# Patient Record
Sex: MALE | Race: WHITE | NOT HISPANIC OR LATINO | Employment: FULL TIME | URBAN - NONMETROPOLITAN AREA
[De-identification: names, ages, dates, MRNs, and addresses within clinical notes are randomized per-mention and may not be internally consistent; named-entity substitution may affect disease eponyms.]

---

## 2020-08-20 ENCOUNTER — OFFICE VISIT (OUTPATIENT)
Dept: FAMILY MEDICINE | Facility: OTHER | Age: 50
End: 2020-08-20
Attending: PHYSICIAN ASSISTANT
Payer: COMMERCIAL

## 2020-08-20 VITALS
RESPIRATION RATE: 14 BRPM | HEART RATE: 58 BPM | BODY MASS INDEX: 23.48 KG/M2 | SYSTOLIC BLOOD PRESSURE: 134 MMHG | TEMPERATURE: 98.2 F | WEIGHT: 149.6 LBS | HEIGHT: 67 IN | DIASTOLIC BLOOD PRESSURE: 72 MMHG

## 2020-08-20 DIAGNOSIS — S69.90XA: Primary | ICD-10-CM

## 2020-08-20 PROCEDURE — 10120 INC&RMVL FB SUBQ TISS SMPL: CPT | Performed by: PHYSICIAN ASSISTANT

## 2020-08-20 PROCEDURE — 99202 OFFICE O/P NEW SF 15 MIN: CPT | Mod: 25 | Performed by: PHYSICIAN ASSISTANT

## 2020-08-20 RX ORDER — CEPHALEXIN 500 MG/1
500 CAPSULE ORAL 4 TIMES DAILY
Qty: 28 CAPSULE | Refills: 0 | Status: CANCELLED | OUTPATIENT
Start: 2020-08-20

## 2020-08-20 ASSESSMENT — MIFFLIN-ST. JEOR: SCORE: 1497.21

## 2020-08-20 NOTE — PROGRESS NOTES
SUBJECTIVE:     FLORIN Fischer is a 50 year old male who presents to clinic today for evaluation of a fish hook stuck in his right hand        Review of Systems       PAST MEDICAL HISTORY: No past medical history on file.    PAST SURGICAL HISTORY: No past surgical history on file.    FAMILY HISTORY: No family history on file.    SOCIAL HISTORY:   Social History     Tobacco Use     Smoking status: Not on file   Substance Use Topics     Alcohol use: Not on file      Allergies not on file  No current outpatient medications on file.     No current facility-administered medications for this visit.        OBJECTIVE:     There were no vitals taken for this visit.  There is no height or weight on file to calculate BMI.  Physical Exam  General: Pleasant, in no apparent distress.  Eyes: Sclera are white and conjunctiva are clear bilaterally. Lacrimal apparatus free of erythema, edema, and discharge bilaterally.  Ears: External ears without erythema or edema. Tympanic membranes are pearly white and without erythema, scarring or perforations bilaterally. External auditory canals are free of foreign bodies, erythema, ulcers, and masses.  Nose: External nose is symmetrical and free of lesions and deformities. Mucosa is soft pink and without erythema, edema, bleeding, or exudate. No septal perforation or deviation.  Oropharynx: Oral mucosa is pink and without ulcers, nodules, and white patches. Tongue is symmetrical, pink, and without masses or lesions. Pharynx is pink, symmetrical, and without lesions. Uvula is midline. Tonsils are pink, symmetrical, and without edema, ulcers, or exudates, and 1+ bilaterally.  Neck: No cervical lymphadenopathy on inspection and palpation.  Thyroid: Thyroid isthmus is palpable and midline. Lobes are palpable bilaterally but not enlarged.  Cardiovascular: Regular rate and rhythm with S1 equal to S2. No murmurs, friction rubs, or gallops.   Respiratory: Lungs are resonant and clear to  "auscultation bilaterally. No wheezes, crackles, or rhonchi.  Abdomen: Abdomen is non-distended and without bulging flanks, prominent venous markings, or ecchymosis. Active bowel sounds heard in all four quadrants. No tenderness to palpation in all four quadrants. No rebound tenderness or guarding. No palpable masses noted. No hepatosplenomegaly.  Musculoskeletal: Back is straight, no tenderness to palpation of paraspinal and paravertebral muscles. Full ROM of back, neck, upper and lower extremities.  Neurologic Exam: Non-focal, symmetric DTRs, normal gross motor, tone coordination and no visible tremor.  Skin: No jaundice, pallor, rashes, or lesions.  Psych: Appropriate mood and affect.      {Diagnostic Test Results:939439::\"Diagnostic Test Results:\",\"none \"}    ASSESSMENT/PLAN:     Keestevan Loza PA-C  [facility]    "

## 2020-08-20 NOTE — PROGRESS NOTES
"HPI:    Tristan Fischer is a 50 year old male who presents to clinic today for fish hook removal. Onset today just PTA. He did try and push one hook through and cut it off but was unsuccessful.  Pain is mild, bleeding controlled. Tetanus updated 2017.     No past medical history on file.      No current outpatient medications on file.       No Known Allergies    ROS:  General: feels well, no fever  Musculoskeletal: fish hook injury to left hand    EXAM:  Vitals:    08/20/20 1559   BP: 134/72   Pulse: 58   Resp: 14   Temp: 98.2  F (36.8  C)   Weight: 67.9 kg (149 lb 9.6 oz)   Height: 1.702 m (5' 7\")     General appearance: well appearing male, in no acute distress  Musculoskeletal: 2 fish hooks in left thumb. Hook in the web space is pushed through, but not to the level of janey. The other hook is through the palmar surface of proximal phalanx. Bleeding controlled.   Dermatological: no rashes or lesions  Psychological: normal affect, alert and pleasant      ASSESSMENT AND PLAN:     Diagnosis Comments   1. Fish hook injury of finger, initial encounter  REMOVE FOREIGN BODY SIMPLE      Fish hook/finger/hand cleaned well with chloro prep.  Hook in web space was pushed through and the janey cut off, hook backed out. Hook in proxima phalanx was backed out after making a small incision with an 11 blade. Wound was cleaned well with soap and water and chloro prep.  A small amount of subcutaneous tissue was bulging from this site. 1 interrupted suture placed using 4.0 nylon. Wound was dressed with topical antibiotic and non adherent dressing. Discussed wound care per AVS. Suture removal in 8-10 days. Return to clinic for s/s of infection. Patient received verbal and written instruction including review of warning signs      Torri Baer PA-C    "

## 2020-08-20 NOTE — NURSING NOTE
Patient presents to clinic with a fishing hook in left hand.  Chasidy Carbajal LPN ....................  8/20/2020   3:58 PM

## 2020-08-20 NOTE — PATIENT INSTRUCTIONS
Fish hook in thumb of left hand  First hook was pushed through and janey cut off, hook was backed out.   Second hook was backed out after making a small cut to allow for the janey.   Tetanus is current in 2017, no need to booster  Symptomatic measures: wash hands well with soap and water, apply topical antibiotic ointment 3 times daily for 7-10 days.   Do not soak hand in any dirty water, dishwater, lake, pool etc. Can shower per normal.   Return to clinic for s/s of infection: redness, swelling, warmth, pain, drainage, fever  Patient Education     Fish Hook Removed  A fish hook has been removed from under your skin. This area may be sore for the next 1 to 2 days. Because this was a dirty puncture-type wound, the risk of infection is higher than normal. Antibiotics may or may not be prescribed depending on various factors such as depth, severity, and location. You may be given a tetanus shot if needed.  Home care  Your healthcare provider will give you instructions on how to care for your wound. These will depend on where the wound is and how serious it is. The following may help you care for your wound at home:    Keep the injured part elevated during the first 2 days. This will help reduce swelling and pain.    Keep the wound clean and dry. If a bandage was applied and it becomes wet or dirty, replace it. Otherwise, leave it in place for the first 24 hours.    Shower as usual, unless your healthcare provider tells you otherwise.    Don't soak in a tub or go swimming for at least 1 week or as instructed by your doctor.    Don't soak the injured area unless your doctor tells you to.    Use acetaminophen or ibuprofen to control pain, unless another pain medicine was prescribed. If you have chronic liver or kidney disease or ever had a stomach ulcer or GI bleeding, talk with your doctor before using these medicines.  Follow-up care  Most puncture wounds heal within 10 days. However, an infection may sometimes occur  despite proper treatment. Check the wound daily for the warning signs listed below.  If stitches were used, they should be removed within 7 to 10 days. If a tape closure was used, remove them after 5 days unless told otherwise.  If any X-rays were taken, a radiologist will look at them. You will be notified if there are new findings that may affect your care.  When to seek medical advice  Call your healthcare provider if any of these occur:    Increasing pain in the wound    Redness, swelling or pus coming from the wound    Fever of 100.4 F (38 C) or higher, or as directed by your healthcare provider  Date Last Reviewed: 10/1/2016    2028-6658 [employer]. [address] [place] [address], [place] [address]. All rights reserved. This information is not intended as a substitute for professional medical care. Always follow your healthcare professional's instructions.

## (undated) RX ORDER — NEOMYCIN/BACITRACIN/POLYMYXINB 3.5-400-5K
OINTMENT (GRAM) TOPICAL
Status: DISPENSED
Start: 2020-08-20

## (undated) RX ORDER — LIDOCAINE HYDROCHLORIDE 10 MG/ML
INJECTION, SOLUTION EPIDURAL; INFILTRATION; INTRACAUDAL; PERINEURAL
Status: DISPENSED
Start: 2020-08-20